# Patient Record
Sex: MALE | Race: WHITE | NOT HISPANIC OR LATINO | URBAN - METROPOLITAN AREA
[De-identification: names, ages, dates, MRNs, and addresses within clinical notes are randomized per-mention and may not be internally consistent; named-entity substitution may affect disease eponyms.]

---

## 2021-04-07 ENCOUNTER — IMPORTED ENCOUNTER (OUTPATIENT)
Dept: URBAN - METROPOLITAN AREA CLINIC 50 | Facility: CLINIC | Age: 71
End: 2021-04-07

## 2021-04-17 ASSESSMENT — VISUAL ACUITY
OS_OTHER: 20/20. 20/25.
OD_PH: 20/25-1
OS_PH: 20/20-1
OD_OTHER: 20/20. 20/20.
OD_BAT: 20/20
OS_CC: J2@ 16 IN
OS_BAT: 20/20
OD_CC: 20/50-2
OD_CC: J2@ 16 IN
OS_CC: 20/30

## 2021-04-17 ASSESSMENT — TONOMETRY
OS_IOP_MMHG: 19
OD_IOP_MMHG: 19

## 2022-03-18 ENCOUNTER — PREPPED CHART (OUTPATIENT)
Dept: URBAN - METROPOLITAN AREA CLINIC 53 | Facility: CLINIC | Age: 72
End: 2022-03-18

## 2022-04-06 ENCOUNTER — COMPREHENSIVE EXAM (OUTPATIENT)
Dept: URBAN - METROPOLITAN AREA CLINIC 53 | Facility: CLINIC | Age: 72
End: 2022-04-06

## 2022-04-06 DIAGNOSIS — H25.813: ICD-10-CM

## 2022-04-06 DIAGNOSIS — H01.006: ICD-10-CM

## 2022-04-06 DIAGNOSIS — H01.003: ICD-10-CM

## 2022-04-06 DIAGNOSIS — H16.223: ICD-10-CM

## 2022-04-06 DIAGNOSIS — E11.9: ICD-10-CM

## 2022-04-06 PROCEDURE — 92014 COMPRE OPH EXAM EST PT 1/>: CPT

## 2022-04-06 PROCEDURE — 92015 DETERMINE REFRACTIVE STATE: CPT

## 2022-04-06 ASSESSMENT — VISUAL ACUITY
OD_PH: 20/25
OD_GLARE: 20/40
OD_GLARE: 20/50
OS_GLARE: 20/40
OS_GLARE: 20/40
OD_CC: 20/30
OD_CC: J1
OS_CC: 20/25
OS_CC: J1

## 2022-04-06 ASSESSMENT — TONOMETRY
OD_IOP_MMHG: 16
OS_IOP_MMHG: 16

## 2022-10-12 ENCOUNTER — ESTABLISHED PATIENT (OUTPATIENT)
Dept: URBAN - METROPOLITAN AREA CLINIC 53 | Facility: CLINIC | Age: 72
End: 2022-10-12

## 2022-10-12 DIAGNOSIS — H01.006: ICD-10-CM

## 2022-10-12 DIAGNOSIS — E11.9: ICD-10-CM

## 2022-10-12 DIAGNOSIS — H01.003: ICD-10-CM

## 2022-10-12 DIAGNOSIS — H16.223: ICD-10-CM

## 2022-10-12 DIAGNOSIS — H25.813: ICD-10-CM

## 2022-10-12 PROCEDURE — 92134 CPTRZ OPH DX IMG PST SGM RTA: CPT

## 2022-10-12 PROCEDURE — 92014 COMPRE OPH EXAM EST PT 1/>: CPT

## 2022-10-12 ASSESSMENT — VISUAL ACUITY
OS_PH: 20/40
OS_GLARE: 20/60
OD_GLARE: 20/60
OS_CC: 20/50
OD_GLARE: 20/60
OS_GLARE: 20/70
OD_PH: 20/50-1
OD_CC: 20/70
OU_CC: J1+ @ 16IN

## 2022-10-12 ASSESSMENT — TONOMETRY
OD_IOP_MMHG: 15
OS_IOP_MMHG: 16

## 2022-10-12 NOTE — PATIENT DISCUSSION
Explained the option of Femto. Discussed lens options. Patient is interested in monofocal lenses or extended depth of focus. Patient has history of central cloudy dystrophy.

## 2022-10-26 ENCOUNTER — PRE-OP/H&P (OUTPATIENT)
Dept: URBAN - METROPOLITAN AREA CLINIC 53 | Facility: CLINIC | Age: 72
End: 2022-10-26

## 2022-10-26 DIAGNOSIS — E11.9: ICD-10-CM

## 2022-10-26 DIAGNOSIS — H25.13: ICD-10-CM

## 2022-10-26 PROCEDURE — PREOP PRE OP VISIT

## 2022-10-26 PROCEDURE — 92136 OPHTHALMIC BIOMETRY: CPT

## 2022-10-26 PROCEDURE — 92025AV CORNEAL TOPOGRAPHY, AV

## 2022-10-26 ASSESSMENT — TONOMETRY
OD_IOP_MMHG: 18
OS_IOP_MMHG: 18

## 2022-10-26 ASSESSMENT — KERATOMETRY
OD_K1POWER_DIOPTERS: 43.50
OS_AXISANGLE2_DEGREES: 90
OD_AXISANGLE2_DEGREES: 118
OS_K2POWER_DIOPTERS: 42.87
OD_K2POWER_DIOPTERS: 43.00
OS_AXISANGLE_DEGREES: 0
OD_AXISANGLE_DEGREES: 028
OS_K1POWER_DIOPTERS: 43.25

## 2022-10-26 ASSESSMENT — VISUAL ACUITY
OU_SC: 20/100-1
OD_SC: 20/150-1
OD_PH: 20/50
OS_PH: 20/40
OS_SC: 20/150-1

## 2022-10-26 NOTE — PATIENT DISCUSSION
AV OS/OD: Discussed with patient in detail laser-assisted vs traditional cataract surgery and all available lens options as well as their associated risks, benefits, limitations and out-of-pocket costs. Patient is a candidate for [facility] OU. Patient wishes to proceed with cataract surgery with [facility] OS. Proceed with cataract surgery OD with [facility], based on confirmation of first eye results, and patient's complaint. The patient understands that with any option, including multifocal/EDOF, there is no guarantee that they will not require glasses to see their best at any distance after surgery. The patient was also educated of the possibility of increased glare/halos around lights. The risks, benefits, and alternatives to surgery were discussed and the patient's questions were answered.

## 2022-11-02 ASSESSMENT — KERATOMETRY
OS_AXISANGLE2_DEGREES: 90
OD_AXISANGLE2_DEGREES: 118
OD_K1POWER_DIOPTERS: 43.50
OS_AXISANGLE_DEGREES: 0
OS_K1POWER_DIOPTERS: 43.25
OS_K2POWER_DIOPTERS: 42.87
OD_K2POWER_DIOPTERS: 43.00
OD_AXISANGLE_DEGREES: 028

## 2022-11-03 ENCOUNTER — POST-OP (OUTPATIENT)
Dept: URBAN - METROPOLITAN AREA CLINIC 49 | Facility: CLINIC | Age: 72
End: 2022-11-03

## 2022-11-03 ENCOUNTER — SURGERY/PROCEDURE (OUTPATIENT)
Dept: URBAN - METROPOLITAN AREA SURGERY 16 | Facility: SURGERY | Age: 72
End: 2022-11-03

## 2022-11-03 DIAGNOSIS — Z98.42: ICD-10-CM

## 2022-11-03 DIAGNOSIS — H25.12: ICD-10-CM

## 2022-11-03 PROCEDURE — 66984AV REMOVE CATARACT, INSERT ADVANCED LENS

## 2022-11-03 PROCEDURE — 99199PAV ADVANCED VISION

## 2022-11-03 PROCEDURE — 99024 POSTOP FOLLOW-UP VISIT: CPT

## 2022-11-03 ASSESSMENT — KERATOMETRY
OS_AXISANGLE2_DEGREES: 90
OD_K2POWER_DIOPTERS: 43.00
OD_AXISANGLE2_DEGREES: 118
OS_AXISANGLE_DEGREES: 0
OD_AXISANGLE_DEGREES: 028
OD_K1POWER_DIOPTERS: 43.50
OS_K1POWER_DIOPTERS: 43.25
OS_K2POWER_DIOPTERS: 42.87

## 2022-11-03 ASSESSMENT — VISUAL ACUITY: OS_SC: 20/50

## 2022-11-03 ASSESSMENT — TONOMETRY: OS_IOP_MMHG: 19

## 2022-11-09 ENCOUNTER — POST OP/EVAL OF SECOND EYE (OUTPATIENT)
Dept: URBAN - METROPOLITAN AREA CLINIC 53 | Facility: CLINIC | Age: 72
End: 2022-11-09

## 2022-11-09 DIAGNOSIS — H25.11: ICD-10-CM

## 2022-11-09 DIAGNOSIS — Z98.42: ICD-10-CM

## 2022-11-09 PROCEDURE — 92136 - 2N OPHTHALMIC BIOMETRY BY PARTIAL COHERENCE INTERFEROMETRY WITH INTRAOCULAR LENS POWER CALCULATION

## 2022-11-09 PROCEDURE — 99213 OFFICE O/P EST LOW 20 MIN: CPT

## 2022-11-09 ASSESSMENT — VISUAL ACUITY
OS_SC: 20/25
OS_SC: J1+ @ 14 IN
OD_SC: 20/150
OD_PH: 20/40

## 2022-11-09 ASSESSMENT — KERATOMETRY
OS_AXISANGLE2_DEGREES: 90
OD_AXISANGLE_DEGREES: 028
OD_K1POWER_DIOPTERS: 43.50
OS_AXISANGLE_DEGREES: 0
OS_K1POWER_DIOPTERS: 43.25
OD_AXISANGLE2_DEGREES: 118
OD_K2POWER_DIOPTERS: 43.00
OS_K2POWER_DIOPTERS: 42.87

## 2022-11-09 ASSESSMENT — TONOMETRY
OS_IOP_MMHG: 16
OD_IOP_MMHG: 15

## 2022-11-09 NOTE — PATIENT DISCUSSION
Patient elects to proceed with cataract surgery using previously discussed plan (CCWET0 20.0 [employer] [place]).

## 2022-11-17 ENCOUNTER — SURGERY/PROCEDURE (OUTPATIENT)
Dept: URBAN - METROPOLITAN AREA SURGERY 16 | Facility: SURGERY | Age: 72
End: 2022-11-17

## 2022-11-17 ENCOUNTER — POST-OP (OUTPATIENT)
Dept: URBAN - METROPOLITAN AREA CLINIC 49 | Facility: CLINIC | Age: 72
End: 2022-11-17

## 2022-11-17 DIAGNOSIS — Z98.41: ICD-10-CM

## 2022-11-17 DIAGNOSIS — Z98.42: ICD-10-CM

## 2022-11-17 DIAGNOSIS — Z96.1: ICD-10-CM

## 2022-11-17 DIAGNOSIS — H25.11: ICD-10-CM

## 2022-11-17 PROCEDURE — 99024 POSTOP FOLLOW-UP VISIT: CPT

## 2022-11-17 PROCEDURE — 66984AV REMOVE CATARACT, INSERT ADVANCED LENS

## 2022-11-17 PROCEDURE — 99199PAV ADVANCED VISION

## 2022-11-17 ASSESSMENT — KERATOMETRY
OS_AXISANGLE_DEGREES: 0
OS_AXISANGLE_DEGREES: 0
OS_K2POWER_DIOPTERS: 42.87
OD_AXISANGLE_DEGREES: 028
OS_K1POWER_DIOPTERS: 43.25
OD_K2POWER_DIOPTERS: 43.00
OD_AXISANGLE2_DEGREES: 118
OD_AXISANGLE_DEGREES: 028
OD_K1POWER_DIOPTERS: 43.50
OS_AXISANGLE2_DEGREES: 90
OS_AXISANGLE2_DEGREES: 90
OD_AXISANGLE2_DEGREES: 118
OS_K1POWER_DIOPTERS: 43.25
OD_K1POWER_DIOPTERS: 43.50
OS_K2POWER_DIOPTERS: 42.87
OD_K2POWER_DIOPTERS: 43.00

## 2022-11-17 ASSESSMENT — TONOMETRY: OD_IOP_MMHG: 21

## 2022-11-17 ASSESSMENT — VISUAL ACUITY: OD_SC: 20/70+1

## 2022-11-23 ENCOUNTER — POST-OP (OUTPATIENT)
Dept: URBAN - METROPOLITAN AREA CLINIC 53 | Facility: CLINIC | Age: 72
End: 2022-11-23

## 2022-11-23 DIAGNOSIS — Z98.41: ICD-10-CM

## 2022-11-23 DIAGNOSIS — Z96.1: ICD-10-CM

## 2022-11-23 PROCEDURE — 99024 POSTOP FOLLOW-UP VISIT: CPT

## 2022-11-23 ASSESSMENT — KERATOMETRY
OS_K2POWER_DIOPTERS: 42.75
OS_AXISANGLE2_DEGREES: 95
OD_AXISANGLE2_DEGREES: 173
OD_K1POWER_DIOPTERS: 43.00
OD_K2POWER_DIOPTERS: 43.25
OD_AXISANGLE_DEGREES: 083
OS_K1POWER_DIOPTERS: 43.00
OS_AXISANGLE_DEGREES: 5

## 2022-11-23 ASSESSMENT — VISUAL ACUITY
OS_SC: 20/20
OD_SC: 20/20
OU_SC: 20/20
OU_SC: J1+ @ 16"

## 2022-11-23 ASSESSMENT — TONOMETRY
OS_IOP_MMHG: 16
OD_IOP_MMHG: 16

## 2022-12-19 ENCOUNTER — POST-OP (OUTPATIENT)
Dept: URBAN - METROPOLITAN AREA CLINIC 53 | Facility: CLINIC | Age: 72
End: 2022-12-19

## 2022-12-19 PROCEDURE — 99024 POSTOP FOLLOW-UP VISIT: CPT

## 2022-12-19 ASSESSMENT — KERATOMETRY
OS_AXISANGLE_DEGREES: 174
OD_AXISANGLE2_DEGREES: 164
OS_AXISANGLE2_DEGREES: 84
OS_K1POWER_DIOPTERS: 42.75
OS_K2POWER_DIOPTERS: 43.50
OD_K2POWER_DIOPTERS: 43.50
OD_K1POWER_DIOPTERS: 43.25
OD_AXISANGLE_DEGREES: 74

## 2022-12-19 ASSESSMENT — VISUAL ACUITY
OD_SC: 20/25-2
OS_SC: 20/25
OU_SC: J1+ @ 16"
OU_SC: 20/20

## 2022-12-19 ASSESSMENT — TONOMETRY
OS_IOP_MMHG: 17
OD_IOP_MMHG: 16

## 2022-12-19 NOTE — PATIENT DISCUSSION
Recommend PF artificial tears 2-3 times daily in both eyes for best vision and comfort. Brands such as Refresh, Thera Tears, and Systane are good options. Went over 20/20/20 rule.

## 2023-10-02 ENCOUNTER — COMPREHENSIVE EXAM (OUTPATIENT)
Dept: URBAN - METROPOLITAN AREA CLINIC 53 | Facility: CLINIC | Age: 73
End: 2023-10-02

## 2023-10-02 DIAGNOSIS — E11.9: ICD-10-CM

## 2023-10-02 DIAGNOSIS — H16.223: ICD-10-CM

## 2023-10-02 PROCEDURE — 92014 COMPRE OPH EXAM EST PT 1/>: CPT

## 2023-10-02 ASSESSMENT — TONOMETRY
OD_IOP_MMHG: 15
OS_IOP_MMHG: 15

## 2023-10-02 ASSESSMENT — VISUAL ACUITY
OD_GLARE: 20/25
OS_PH: 20/25
OS_GLARE: 20/30
OU_SC: J1@16"
OS_GLARE: 20/30-1
OS_SC: 20/30-1
OD_PH: 20/20-1
OD_SC: 20/30-1
OD_GLARE: 20/25

## 2024-12-24 ENCOUNTER — COMPREHENSIVE EXAM (OUTPATIENT)
Age: 74
End: 2024-12-24

## 2024-12-24 DIAGNOSIS — E11.9: ICD-10-CM

## 2024-12-24 DIAGNOSIS — H16.223: ICD-10-CM

## 2024-12-24 PROCEDURE — 99214 OFFICE O/P EST MOD 30 MIN: CPT
